# Patient Record
Sex: FEMALE
[De-identification: names, ages, dates, MRNs, and addresses within clinical notes are randomized per-mention and may not be internally consistent; named-entity substitution may affect disease eponyms.]

---

## 2024-08-13 ENCOUNTER — DASHBOARD ENCOUNTERS (OUTPATIENT)
Age: 49
End: 2024-08-13

## 2024-08-13 ENCOUNTER — OFFICE VISIT (OUTPATIENT)
Dept: URBAN - METROPOLITAN AREA CLINIC 63 | Facility: CLINIC | Age: 49
End: 2024-08-13
Payer: COMMERCIAL

## 2024-08-13 VITALS
WEIGHT: 199 LBS | TEMPERATURE: 98.2 F | DIASTOLIC BLOOD PRESSURE: 80 MMHG | SYSTOLIC BLOOD PRESSURE: 118 MMHG | HEIGHT: 66 IN | OXYGEN SATURATION: 97 % | BODY MASS INDEX: 31.98 KG/M2 | HEART RATE: 87 BPM

## 2024-08-13 DIAGNOSIS — Z12.11 COLON CANCER SCREENING: ICD-10-CM

## 2024-08-13 DIAGNOSIS — K21.9 CHRONIC GERD: ICD-10-CM

## 2024-08-13 PROBLEM — 235595009: Status: ACTIVE | Noted: 2024-08-13

## 2024-08-13 PROCEDURE — 99204 OFFICE O/P NEW MOD 45 MIN: CPT

## 2024-08-13 RX ORDER — ESOMEPRAZOLE MAGNESIUM 40 MG/1
1 CAPSULE CAPSULE, DELAYED RELEASE PELLETS ORAL ONCE A DAY
Qty: 30 CAPSULE | Refills: 1 | OUTPATIENT
Start: 2024-08-13

## 2024-08-13 RX ORDER — FAMOTIDINE 20 MG/1
TAKE 1 TABLET TABLET, FILM COATED ORAL ONCE A DAY
Status: ACTIVE | COMMUNITY

## 2024-08-13 NOTE — HPI-TODAY'S VISIT:
This is a very pleasant 48-year-old female who presents to the office with a chief complaint of reflux.  She is a new patient to our practice and will be establishing under Dr. Hurley today.  Past medical history significant for menorrhea, endometriosis. Past surgical history is significant for appendenctomy. Family history is significant for paternal leukemia, and maternal lung cancer, patient denies history of GI. malignancies. Last colonoscopy none, cologaurd May 2022, negative, per patient, we do not have these records Patient is endoscopy naive. Cardiologist: none . Patient presents to the office today explaining that her reflux started back in December 2023 with her stomach "feeling sick".  She explains that her reflux is worse at night and will cause chest pain as well as an acid taste in her mouth.  She explains on several occasions this kept her awake "all night".  She explained that since December 2023, this reflux has gotten progressively worse, to where she now will have reflux symptoms during the day.  She does her best to eat about 3 hours before bed.  When this reflux occurs it will generally be at its worst at about 2 or 3 in the morning in which she will wake up.  Patient has tried famotidine 20 mg over-the-counter, with some efficacy to her symptoms, she is also tried Nexium over-the-counter which gave her great relief of her symptoms.  I discussed with the patient that we can try a 2-month trial of high-dose PPIs, with a taper off to see if this  resolves her reflux.  Patient is amenable to plan and will follow-up in 2 months. . Patient also explains that she took a Cologuard in May 2022 which was negative.  We do not have these records.  I explained to the patient the use of screening colonoscopies for CRC surveillance.  Patient explained that she is starting a new job soon, and will get back to us as far as her decision to continue with colonoscopy or to use Cologuard.  Patient will follow-up in 2 months. . Patient denies abdominal pain, belching, bloating, constipation, diarrhea, dysphagia, melena, hematochezia, hematemesis, nausea, vomiting, BRBPR, and unintentional weight loss.

## 2024-08-25 ENCOUNTER — WEB ENCOUNTER (OUTPATIENT)
Dept: URBAN - METROPOLITAN AREA CLINIC 63 | Facility: CLINIC | Age: 49
End: 2024-08-25

## 2024-08-27 ENCOUNTER — TELEPHONE ENCOUNTER (OUTPATIENT)
Dept: URBAN - METROPOLITAN AREA CLINIC 63 | Facility: CLINIC | Age: 49
End: 2024-08-27

## 2024-10-21 ENCOUNTER — OFFICE VISIT (OUTPATIENT)
Dept: URBAN - METROPOLITAN AREA CLINIC 63 | Facility: CLINIC | Age: 49
End: 2024-10-21